# Patient Record
Sex: MALE | NOT HISPANIC OR LATINO | URBAN - METROPOLITAN AREA
[De-identification: names, ages, dates, MRNs, and addresses within clinical notes are randomized per-mention and may not be internally consistent; named-entity substitution may affect disease eponyms.]

---

## 2020-08-01 ENCOUNTER — HOSPITAL ENCOUNTER (EMERGENCY)
Facility: HOSPITAL | Age: 35
Discharge: HOME | End: 2020-08-01
Attending: EMERGENCY MEDICINE | Admitting: EMERGENCY MEDICINE

## 2020-08-01 VITALS
OXYGEN SATURATION: 92 % | DIASTOLIC BLOOD PRESSURE: 75 MMHG | HEIGHT: 69 IN | TEMPERATURE: 98.7 F | SYSTOLIC BLOOD PRESSURE: 106 MMHG | BODY MASS INDEX: 29.62 KG/M2 | WEIGHT: 200 LBS | RESPIRATION RATE: 22 BRPM | HEART RATE: 80 BPM

## 2020-08-01 DIAGNOSIS — T40.1X1A HEROIN OVERDOSE, ACCIDENTAL OR UNINTENTIONAL, INITIAL ENCOUNTER (HCC): Primary | ICD-10-CM

## 2020-08-01 DIAGNOSIS — F15.10 METHAMPHETAMINE ABUSE (HCC): ICD-10-CM

## 2020-08-01 DIAGNOSIS — F13.10 BENZODIAZEPINE ABUSE (HCC): ICD-10-CM

## 2020-08-01 PROCEDURE — 99283 EMERGENCY DEPT VISIT LOW MDM: CPT

## 2020-08-01 NOTE — DISCHARGE INSTRUCTIONS
The patient is advised to avoid further illegal drug use.    Follow-up with primary care physician or return to the ER as needed.

## 2020-08-01 NOTE — ED PROVIDER NOTES
Subjective   35-year-old male brought in by EMS for evaluation of overdose.  The patient reportedly was unresponsive until 2 mg of IV Narcan was administered by EMS in which case the patient awoke.  He reported to the EMS staff that he had used heroin and Xanax.  Here in the ER the patient is very somnolent but will awake and answer questions with moderate to physical stimuli is applied.  He denies any infectious symptoms including no fever, bodies, or chills.  No reported chest pain, cough, shortness of breath, sore throat, rhinorrhea, change in sense of taste or smell.  No reported abdominal pain, no nausea or vomiting, no change in bowel or urinary function.  He denies any sick contacts.  No other reported aggravating, alleviating, or associated symptoms.          Review of Systems   Constitutional: Negative for chills, fatigue and fever.        Somnolent   HENT: Negative for congestion, ear pain, postnasal drip, sinus pressure and sore throat.    Eyes: Negative for pain, redness and visual disturbance.   Respiratory: Negative for cough, chest tightness and shortness of breath.    Cardiovascular: Negative for chest pain, palpitations and leg swelling.   Gastrointestinal: Negative for abdominal pain, anal bleeding, blood in stool, diarrhea, nausea and vomiting.   Endocrine: Negative for polydipsia and polyuria.   Genitourinary: Negative for difficulty urinating, dysuria, frequency and urgency.   Musculoskeletal: Negative for arthralgias, back pain and neck pain.   Skin: Negative for pallor and rash.   Allergic/Immunologic: Negative for environmental allergies and immunocompromised state.   Neurological: Negative for dizziness, weakness and headaches.   Hematological: Negative for adenopathy.   Psychiatric/Behavioral: Positive for confusion. Negative for self-injury and suicidal ideas. The patient is not nervous/anxious.    All other systems reviewed and are negative.      No past medical history on file.    No  Known Allergies    No past surgical history on file.    No family history on file.    Social History     Socioeconomic History   • Marital status: Unknown     Spouse name: Not on file   • Number of children: Not on file   • Years of education: Not on file   • Highest education level: Not on file           Objective   Physical Exam   Constitutional: He is oriented to person, place, and time. He appears well-developed and well-nourished.  Non-toxic appearance. No distress.   somnolent   HENT:   Head: Normocephalic and atraumatic.   Right Ear: External ear normal.   Left Ear: External ear normal.   Nose: Nose normal.   Eyes: Pupils are equal, round, and reactive to light. EOM and lids are normal.   Neck: Normal range of motion. Neck supple. No tracheal deviation present.   Cardiovascular: Normal rate, regular rhythm and normal heart sounds. Exam reveals no gallop, no friction rub and no decreased pulses.   No murmur heard.  Pulmonary/Chest: Effort normal and breath sounds normal. No respiratory distress. He has no decreased breath sounds. He has no wheezes. He has no rhonchi. He has no rales.   Abdominal: Soft. Normal appearance and bowel sounds are normal. There is no tenderness. There is no rebound and no guarding.   Musculoskeletal: Normal range of motion. He exhibits no deformity.   Lymphadenopathy:     He has no cervical adenopathy.   Neurological: He is alert and oriented to person, place, and time. He has normal strength. No cranial nerve deficit or sensory deficit.   Skin: Skin is warm and dry. No rash noted. He is not diaphoretic.   Psychiatric: He has a normal mood and affect. His speech is normal and behavior is normal. Judgment and thought content normal. Cognition and memory are normal.   Nursing note and vitals reviewed.      Procedures           ED Course                                           MDM  Number of Diagnoses or Management Options  Benzodiazepine abuse (CMS/HCC): new and requires  workup  Heroin overdose, accidental or unintentional, initial encounter (CMS/[facility]): new and requires workup  Methamphetamine abuse (CMS/[facility]): new and requires workup  Diagnosis management comments: The patient was observed for several hours and oxygen saturations remained normal even while sleeping without application of oxygen.  Vital signs also remain normal including normal blood pressure.  The patient is afebrile and denies any respiratory symptoms.    The patient was observed to be able to stand and bear weight on his own and answers all questions appropriately.  He was able to call a friend who will actually take the patient home to continue to rest.    The patient denies being suicidal, reports using accommodation of heroin, Xanax, and methamphetamine for recreational purposes.       Amount and/or Complexity of Data Reviewed  Obtain history from someone other than the patient: yes  Review and summarize past medical records: yes  Independent visualization of images, tracings, or specimens: yes        Final diagnoses:   Heroin overdose, accidental or unintentional, initial encounter (CMS/[facility])   Methamphetamine abuse (CMS/[facility])   Benzodiazepine abuse (CMS/[facility])            Naveen Carlos MD  08/01/20 3361

## 2023-05-19 DIAGNOSIS — F90.2 ATTENTION DEFICIT HYPERACTIVITY DISORDER (ADHD), COMBINED TYPE: Primary | ICD-10-CM

## 2023-05-19 NOTE — TELEPHONE ENCOUNTER
Incoming Refill Request      Medication requested (name and dose):     VYVANCE 70MG ONE TIME DAILY     Pharmacy where request should be sent:    [facility] [facility].     Additional details provided by patient:     PATIENT IS COMPLETELY OUT OF MEDICINE    Best call back number: [phone]    Does the patient have less than a 3 day supply:  [x] Yes  [] No    Laila Deborah Grace Rep  05/19/23, 14:03 EDT        {Tip  DIRECTIONS Send the encounter HIGH priority, If patient has less than a 3 day supply. If the patient will run out of medication over the weekend add that information to the additional details line. Send this encounter to the clinical pool:1982

## 2023-05-22 NOTE — TELEPHONE ENCOUNTER
Rx Refill Note    Requested Prescriptions     Pending Prescriptions Disp Refills   • lisdexamfetamine (Vyvanse) 70 MG capsule 30 capsule 0     Sig: Take 1 capsule by mouth Every Morning        Last office visit with prescribing clinician: Visit date not found      Next office visit with prescribing clinician: 6/12/2023   Last labs:   Last refill:  n/a  Pharmacy (be sure to add in Epic). I believe this is the correct pharmacy?

## 2023-06-12 ENCOUNTER — TELEMEDICINE (OUTPATIENT)
Dept: BEHAVIORAL HEALTH | Facility: CLINIC | Age: 38
End: 2023-06-12
Payer: MEDICAID

## 2023-06-12 VITALS
HEIGHT: 69 IN | WEIGHT: 200.2 LBS | BODY MASS INDEX: 29.65 KG/M2 | SYSTOLIC BLOOD PRESSURE: 126 MMHG | DIASTOLIC BLOOD PRESSURE: 77 MMHG | HEART RATE: 73 BPM

## 2023-06-12 DIAGNOSIS — F90.2 ATTENTION DEFICIT HYPERACTIVITY DISORDER (ADHD), COMBINED TYPE: Primary | ICD-10-CM

## 2023-06-12 DIAGNOSIS — F15.21: ICD-10-CM

## 2023-06-12 DIAGNOSIS — F41.1 GENERALIZED ANXIETY DISORDER: ICD-10-CM

## 2023-06-12 DIAGNOSIS — E66.3 OVERWEIGHT (BMI 25.0-29.9): ICD-10-CM

## 2023-06-12 DIAGNOSIS — F33.1 MODERATE EPISODE OF RECURRENT MAJOR DEPRESSIVE DISORDER: ICD-10-CM

## 2023-06-12 DIAGNOSIS — F50.81 BINGE EATING DISORDER: ICD-10-CM

## 2023-06-12 DIAGNOSIS — F43.10 POST TRAUMATIC STRESS DISORDER (PTSD): ICD-10-CM

## 2023-06-12 PROBLEM — E66.9 OBESITY: Status: ACTIVE | Noted: 2020-02-17

## 2023-06-12 PROBLEM — R63.2 BINGE EATING: Status: ACTIVE | Noted: 2018-12-17

## 2023-06-12 PROBLEM — E55.9 VITAMIN D DEFICIENCY: Status: ACTIVE | Noted: 2019-09-13

## 2023-06-12 PROCEDURE — 1159F MED LIST DOCD IN RCRD: CPT | Performed by: NURSE PRACTITIONER

## 2023-06-12 PROCEDURE — 1160F RVW MEDS BY RX/DR IN RCRD: CPT | Performed by: NURSE PRACTITIONER

## 2023-06-12 PROCEDURE — 99214 OFFICE O/P EST MOD 30 MIN: CPT | Performed by: NURSE PRACTITIONER

## 2023-06-12 RX ORDER — OMEPRAZOLE 40 MG/1
40 CAPSULE, DELAYED RELEASE ORAL DAILY
COMMUNITY
Start: 2023-03-07

## 2023-06-12 RX ORDER — FLUOXETINE HYDROCHLORIDE 60 MG/1
TABLET, FILM COATED ORAL; ORAL
Qty: 30 TABLET | Refills: 2 | Status: SHIPPED | OUTPATIENT
Start: 2023-06-12

## 2023-06-12 RX ORDER — EMTRICITABINE AND TENOFOVIR ALAFENAMIDE 200; 25 MG/1; MG/1
1 TABLET ORAL DAILY
COMMUNITY
Start: 2023-05-25

## 2023-06-12 RX ORDER — BUPROPION HYDROCHLORIDE 300 MG/1
TABLET ORAL
COMMUNITY
Start: 2020-06-01 | End: 2023-06-12 | Stop reason: SDUPTHER

## 2023-06-12 RX ORDER — CLONAZEPAM 1 MG/1
TABLET ORAL
Qty: 60 TABLET | Refills: 2 | Status: SHIPPED | OUTPATIENT
Start: 2023-06-12

## 2023-06-12 RX ORDER — CLONAZEPAM 1 MG/1
TABLET ORAL
COMMUNITY
Start: 2020-06-11 | End: 2023-06-12 | Stop reason: SDUPTHER

## 2023-06-12 RX ORDER — BUPROPION HYDROCHLORIDE 300 MG/1
TABLET ORAL
Qty: 30 TABLET | Refills: 2 | Status: SHIPPED | OUTPATIENT
Start: 2023-06-12

## 2023-06-12 RX ORDER — FLUOXETINE HYDROCHLORIDE 60 MG/1
TABLET, FILM COATED ORAL; ORAL
COMMUNITY
Start: 2023-05-17 | End: 2023-06-12 | Stop reason: SDUPTHER

## 2023-06-12 NOTE — PROGRESS NOTES
Follow Up Office Visit      Patient Name: Amos Echevarria  : 1985   MRN: [id number]     Referring Provider: Barber West MD    Chief Complaint:    No diagnosis found.     History of Present Illness:   Amos Echevarria is a 38 y.o. male who is here today for follow up with ***    Subjective      Patient Reports: ***    Review of Systems:   Review of Systems    Screening Scores:   {Screen Scores:26905}    RISK ASSESSMENT:  Patient denies any thoughts or intent of suicide today. Patient denies any impulsive behavior today.     Medications:     Current Outpatient Medications:     lisdexamfetamine (Vyvanse) 70 MG capsule, Take 1 capsule by mouth Every Morning, Disp: 30 capsule, Rfl: 0    Medication Considerations:  JABIER reviewed and appropriate.      Allergies:   No Known Allergies    Results Reviewed:   ***     Objective     Physical Exam:  Vital Signs: There were no vitals filed for this visit.  There is no height or weight on file to calculate BMI.     Mental Status Exam:   Hygiene:   {good/fair/poor:919388839}  Cooperation:  {Cooperation:774350459}  Eye Contact:  {Eye Contact:266338387}  Psychomotor Behavior:  {Psychomotor Behavior:42053}  Affect:  {Affect:285909326}  Mood: {Mood:04557}  Speech:  {Speech:728182480}  Thought Process:  {Thought Process:854425382}  Thought Content:  {Thought Content:823233118}  Suicidal:  {Suicidal:850459529}  Homicidal:  {Homidical:789775309}  Hallucinations:  {Hallucinations:033385916}  Delusion:  {Delusion:554652538}  Memory:  {Memory:385281551}  Orientation:  {Orientation:413338036}  Reliability:  {good/fair/poor:942033684}  Insight:  {good/fair/poor/none:718176097}  Judgement:  {good/fair/poor/impaired:334706195}  Impulse Control:  {good/fair/poor/impaired:252155974}  Physical/Medical Issues:  {No/Yes:038528673}     Assessment / Plan      Visit Diagnosis/Orders Placed This Visit:  There are no diagnoses linked to this encounter.     Functional Status:  {rsfunctionalstatus:79790}    Prognosis: {tnprognosis:80995}    Impression/Formulation:  Patient appeared alert and oriented.  Patient is voluntarily requesting to begin outpatient therapy at [facility].  Patient is receptive to assistance with maintaining a stable lifestyle.  Patient presents with history of No diagnosis found..     Treatment Plan:     ***  Patient will continue supportive psychotherapy efforts and medications as indicated. Clinic will obtain release of information for current treatment team for continuity of care as needed. Patient will contact this office, call 911 or present to the nearest emergency room should suicidal or homicidal ideations occur.  Discussed medication options and treatment plan of prescribed medication(s) as well as the risks, benefits, and potential side effects. Patient ackowledged and verbally consented to continue with current treatment plan and was educated on the importance of compliance with treatment and follow-up appointments.     Follow Up:   No follow-ups on file.        HERBERT Jarrell, SULAIMAN  [facility]     This is electronically signed by HERBERT Jarrell, SULAIMAN  [unfilled] 16:24 EDT

## 2023-06-12 NOTE — PROGRESS NOTES
Video Visit      Patient Name: Amos Echevarria  : 1985   MRN: [id number]     Referring Provider: Barber West MD    Chief Complaint:      ICD-10-CM ICD-9-CM   1. Attention deficit hyperactivity disorder (ADHD), combined type  F90.2 314.01   2. Generalized anxiety disorder  F41.1 300.02   3. Moderate episode of recurrent major depressive disorder  F33.1 296.32   4. Post traumatic stress disorder (PTSD)  F43.10 309.81   5. Binge eating disorder  F50.81 307.50   6. Overweight (BMI 25.0-29.9)  E66.3 278.02   7. Moderate amphetamine substance use disorder, in sustained remission  F15.21 304.43        This provider is located at the [facility] [address] [place] [address] using a secure Contentful Video Visit through [employer]. Patient is being seen remotely via telehealth video visit at their home address in [place], and stated they are in a secure environment for this session. The patient's condition being diagnosed/treated is appropriate for telemedicine. The provider identified herself as well as her credentials. The patient, and/or patients guardian, consent to be seen remotely, and when consent is given they understand that the consent allows for patient identifiable information to be sent to a third party as needed. They may refuse to be seen remotely at any time. The electronic data is encrypted and password protected, and the patient and/or guardian has been advised of the potential risks to privacy not withstanding such measures.    The patient has chosen to receive care today through a telehealth video visit. Do you consent to use a video/audio connection for your medical care today? Yes    History of Present Illness:   Amos Echevarria is a 38 y.o. male who is an established patient being seen by a video visit today for psychiatric medication management.    Subjective      Patient reports he is struggling with his emotions  lately because he hasn't been able to communicate as often with his mom as he is used to.  He has also regained about 20+ pounds since he was in the weight management program due to binge eating carbohydrates and sweets which is causing him to feel bad about himself.  He is still in the process of looking for a job which has been stressful.  He has a telephone interview coming up for a position that he is very interested in and would be well suited for.  He reports adhd symptoms have been worse but it could be due to everything going on in his life right now.    Review of Systems:   Review of Systems   Psychiatric/Behavioral:  Positive for decreased concentration, dysphoric mood and stress. The patient is nervous/anxious.      Screening Scores:   None today.    RISK ASSESSMENT:  Patient denies any high risk factors today.     Medications:     Current Outpatient Medications:     buPROPion XL (WELLBUTRIN XL) 300 MG 24 hr tablet, Take 1 tablet by mouth daily., Disp: 30 tablet, Rfl: 2    Cholecalciferol 25 MCG (1000 UT) tablet, Take 1 tablet by mouth Daily., Disp: , Rfl:     clonazePAM (KlonoPIN) 1 MG tablet, Take 1 tablet by mouth twice daily as needed for anxiety., Disp: 60 tablet, Rfl: 2    Descovy 200-25 MG per tablet, Take 1 tablet by mouth Daily., Disp: , Rfl:     dolutegravir (TIVICAY) 50 MG tablet, Take 1 tablet by mouth Daily., Disp: , Rfl:     FLUoxetine (PROzac) 60 MG tablet, Take 1 capsule by mouth daily., Disp: 30 tablet, Rfl: 2    lisdexamfetamine (Vyvanse) 70 MG capsule, Take 1 capsule by mouth Every Morning, Disp: 30 capsule, Rfl: 0    Naltrexone (VIVITROL) 380 MG reconstituted suspension IM suspension, Inject 380 mg into the appropriate muscle as directed by prescriber Every 28 (Twenty-Eight) Days., Disp: , Rfl:     omeprazole (priLOSEC) 40 MG capsule, Take 1 capsule by mouth Daily., Disp: , Rfl:     Medication Considerations:  JABIER reviewed and appropriate.      Allergies:   No Known  "Allergies    Objective     Physical Exam:  Vital Signs:   Vitals:    06/12/23 1715   BP: 126/77   Pulse: 73   Weight: 90.8 kg (200 lb 3.2 oz)   Height: 175.3 cm (69\")     Body mass index is 29.56 kg/m².     Mental Status Exam:   Hygiene:   good  Cooperation:  Cooperative  Eye Contact:  Good  Psychomotor Behavior:  Appropriate  Affect:  Appropriate  Mood: sad  Speech:  Normal  Thought Process:  Goal directed and Linear  Thought Content:  Normal  Suicidal:  None  Homicidal:  None  Hallucinations:  None  Delusion:  None  Memory:  Intact  Orientation:  Grossly intact  Reliability:  good  Insight:  Good  Judgement:  Fair  Impulse Control:  Fair  Physical/Medical Issues:  Yes HIV positive, weight gain      Assessment / Plan      Visit Diagnosis/Orders Placed This Visit:  Diagnoses and all orders for this visit:    1. Attention deficit hyperactivity disorder (ADHD), combined type (Primary)  -     buPROPion XL (WELLBUTRIN XL) 300 MG 24 hr tablet; Take 1 tablet by mouth daily.  Dispense: 30 tablet; Refill: 2  -     lisdexamfetamine (Vyvanse) 70 MG capsule; Take 1 capsule by mouth Every Morning  Dispense: 30 capsule; Refill: 0    2. Generalized anxiety disorder  -     FLUoxetine (PROzac) 60 MG tablet; Take 1 capsule by mouth daily.  Dispense: 30 tablet; Refill: 2  -     clonazePAM (KlonoPIN) 1 MG tablet; Take 1 tablet by mouth twice daily as needed for anxiety.  Dispense: 60 tablet; Refill: 2    3. Moderate episode of recurrent major depressive disorder  -     buPROPion XL (WELLBUTRIN XL) 300 MG 24 hr tablet; Take 1 tablet by mouth daily.  Dispense: 30 tablet; Refill: 2  -     FLUoxetine (PROzac) 60 MG tablet; Take 1 capsule by mouth daily.  Dispense: 30 tablet; Refill: 2    4. Post traumatic stress disorder (PTSD)  -     FLUoxetine (PROzac) 60 MG tablet; Take 1 capsule by mouth daily.  Dispense: 30 tablet; Refill: 2    5. Binge eating disorder  -     lisdexamfetamine (Vyvanse) 70 MG capsule; Take 1 capsule by mouth Every " Morning  Dispense: 30 capsule; Refill: 0    6. Overweight (BMI 25.0-29.9)  -     buPROPion XL (WELLBUTRIN XL) 300 MG 24 hr tablet; Take 1 tablet by mouth daily.  Dispense: 30 tablet; Refill: 2    7. Moderate amphetamine substance use disorder, in sustained remission         Functional Status: No impairment    Prognosis: Good with Ongoing Treatment     Impression/Formulation:  Patient appeared alert and oriented.  Patient is voluntarily requesting to begin outpatient medication management at [facility].  Patient is receptive to assistance with maintaining a stable lifestyle.  Patient presents with history of     ICD-10-CM ICD-9-CM   1. Attention deficit hyperactivity disorder (ADHD), combined type  F90.2 314.01   2. Generalized anxiety disorder  F41.1 300.02   3. Moderate episode of recurrent major depressive disorder  F33.1 296.32   4. Post traumatic stress disorder (PTSD)  F43.10 309.81   5. Binge eating disorder  F50.81 307.50   6. Overweight (BMI 25.0-29.9)  E66.3 278.02   7. Moderate amphetamine substance use disorder, in sustained remission  F15.21 304.43       Patient has gained weight due to carbohydrate cravings and binge eating.  Patient has been more emotional due to life stressors, looking for a new job, decrease with family communication but feels his medications are working for him.  Patient agrees that stress and worries are having a negative effect and will work towards accomplishing his goals.      Treatment Plan:     Continue medications at current dosing  Schedule appointment for weight management  Patient will continue supportive psychotherapy efforts and medications as indicated. Clinic will obtain release of information for current treatment team for continuity of care as needed. Patient will contact this office, call 911 or present to the nearest emergency room should suicidal or homicidal ideations occur. Discussed medication options and treatment plan of prescribed  medication(s) as well as the risks, benefits, and potential side effects. Patient ackowledged and verbally consented to continue with current treatment plan and was educated on the importance of compliance with treatment and follow-up appointments.     Follow Up:   Return in about 1 week (around 6/19/2023) for Weight Mgmt.        HERBERT Jarrell, KEMIP-BC  [facility]     This is electronically signed by HERBERT Jarrell, SULAIMAN  [unfilled] 17:16 EDT

## 2023-08-23 DIAGNOSIS — F90.2 ATTENTION DEFICIT HYPERACTIVITY DISORDER (ADHD), COMBINED TYPE: ICD-10-CM

## 2023-08-23 DIAGNOSIS — F50.81 BINGE EATING DISORDER: ICD-10-CM

## 2023-08-24 ENCOUNTER — TELEPHONE (OUTPATIENT)
Dept: BEHAVIORAL HEALTH | Facility: CLINIC | Age: 38
End: 2023-08-24
Payer: MEDICAID

## 2023-08-24 NOTE — TELEPHONE ENCOUNTER
Patient called and left a message saying that his pharmacy said that his Vyvanse 70mg is on backorder and is wanting to know if you can send in a Rx for Vyvanse 30mg and 40mg.

## 2023-09-20 ENCOUNTER — TELEMEDICINE (OUTPATIENT)
Dept: BEHAVIORAL HEALTH | Facility: CLINIC | Age: 38
End: 2023-09-20
Payer: MEDICAID

## 2023-09-20 DIAGNOSIS — F33.1 MODERATE EPISODE OF RECURRENT MAJOR DEPRESSIVE DISORDER: ICD-10-CM

## 2023-09-20 DIAGNOSIS — F43.10 POST TRAUMATIC STRESS DISORDER (PTSD): ICD-10-CM

## 2023-09-20 DIAGNOSIS — F50.81 BINGE EATING DISORDER: ICD-10-CM

## 2023-09-20 DIAGNOSIS — F90.2 ATTENTION DEFICIT HYPERACTIVITY DISORDER (ADHD), COMBINED TYPE: ICD-10-CM

## 2023-09-20 DIAGNOSIS — E66.3 OVERWEIGHT (BMI 25.0-29.9): ICD-10-CM

## 2023-09-20 DIAGNOSIS — F41.1 GENERALIZED ANXIETY DISORDER: Primary | ICD-10-CM

## 2023-09-20 RX ORDER — BUPROPION HYDROCHLORIDE 300 MG/1
TABLET ORAL
Qty: 30 TABLET | Refills: 2 | Status: SHIPPED | OUTPATIENT
Start: 2023-09-20

## 2023-09-20 RX ORDER — CLONAZEPAM 1 MG/1
TABLET ORAL
Qty: 60 TABLET | Refills: 2 | Status: SHIPPED | OUTPATIENT
Start: 2023-09-20

## 2023-09-20 RX ORDER — FLUOXETINE HYDROCHLORIDE 60 MG/1
TABLET, FILM COATED ORAL; ORAL
Qty: 30 TABLET | Refills: 2 | Status: SHIPPED | OUTPATIENT
Start: 2023-09-20

## 2023-09-20 NOTE — PROGRESS NOTES
Video Visit      Patient Name: Amos Echevarria  : 1985   MRN: [id number]     Referring Provider: Barber West MD    Chief Complaint:      ICD-10-CM ICD-9-CM   1. Generalized anxiety disorder  F41.1 300.02   2. Attention deficit hyperactivity disorder (ADHD), combined type  F90.2 314.01   3. Moderate episode of recurrent major depressive disorder  F33.1 296.32   4. Post traumatic stress disorder (PTSD)  F43.10 309.81   5. Overweight (BMI 25.0-29.9)  E66.3 278.02   6. Binge eating disorder  F50.81 307.50        This provider is located at the [facility] (through [facility]), [address] [place] [address]. [id number] using a secure 9flatst Video Visit through [employer]. Patient is being seen remotely via telehealth video visit at their home address in [place], and stated they are in a secure environment for this session. The patient's condition being diagnosed/treated is appropriate for telemedicine. The provider identified herself as well as her credentials. The patient, and/or patients guardian, consent to be seen remotely, and when consent is given they understand that the consent allows for patient identifiable information to be sent to a third party as needed. They may refuse to be seen remotely at any time. The electronic data is encrypted and password protected, and the patient and/or guardian has been advised of the potential risks to privacy not withstanding such measures.    The patient has chosen to receive care today through a telehealth video visit. Do you consent to use a video/audio connection for your medical care today? Yes    History of Present Illness:   Amos Echevarria is a 38 y.o. male who is being seen by a video visit today for psychiatric medications.    Subjective      I had an interview with a new office at [employer] today.  I think I got the job.  My car was taken away because it wasn't put in my name and Amos had a judgement  against him.  I am going to have to move in November, Guera mom is selling the house we are living in. My mom and sister want me to come back to [place].       I have gained a lot of weight and I think something is going on with my feet.  My doctor thinks it could be diabetes related.  I need to see her again soon.  I am having terrible sweet cravings and can't stop binge eating at night. I want to come back and see you for weight management again.    Review of Systems:   Review of Systems   Psychiatric/Behavioral:  Positive for dysphoric mood and stress. The patient is nervous/anxious.      RISK ASSESSMENT:   Patient denies any high risk factors today.     Medications:     Current Outpatient Medications:     buPROPion XL (WELLBUTRIN XL) 300 MG 24 hr tablet, Take 1 tablet by mouth daily., Disp: 30 tablet, Rfl: 2    clonazePAM (KlonoPIN) 1 MG tablet, Take 1 tablet by mouth twice daily as needed for anxiety., Disp: 60 tablet, Rfl: 2    FLUoxetine (PROzac) 60 MG tablet, Take 1 capsule by mouth daily., Disp: 30 tablet, Rfl: 2    lisdexamfetamine (Vyvanse) 70 MG capsule, Take 1 capsule by mouth Every Morning, Disp: 30 capsule, Rfl: 0    Cholecalciferol 25 MCG (1000 UT) tablet, Take 1 tablet by mouth Daily., Disp: , Rfl:     Descovy 200-25 MG per tablet, Take 1 tablet by mouth Daily., Disp: , Rfl:     dolutegravir (TIVICAY) 50 MG tablet, Take 1 tablet by mouth Daily., Disp: , Rfl:     Naltrexone (VIVITROL) 380 MG reconstituted suspension IM suspension, Inject 380 mg into the appropriate muscle as directed by prescriber Every 28 (Twenty-Eight) Days., Disp: , Rfl:     omeprazole (priLOSEC) 40 MG capsule, Take 1 capsule by mouth Daily., Disp: , Rfl:     Medication Considerations:  JABIER reviewed and appropriate.      Allergies:   No Known Allergies    Objective     Physical Exam:  Vital Signs: There were no vitals filed for this visit.  There is no height or weight on file to calculate BMI.     Mental Status Exam:    Hygiene:   good  Cooperation:  Cooperative  Eye Contact:  Good  Psychomotor Behavior:  Hyperactive  Affect:  Appropriate  Mood: depressed, anxious, irritable, and fluctates  Speech:  Rapid  Thought Process:  Linear  Thought Content:  Mood congruent  Suicidal:  None  Homicidal:  None  Hallucinations:  None  Delusion:  None  Memory:  Intact  Orientation:  Grossly intact  Reliability:  fair  Insight:  Fair  Judgement:  Fair  Impulse Control:  Poor  Physical/Medical Issues:  Yes foot fungus/neuropathy/circulation issue, HIV      Assessment / Plan      Visit Diagnosis/Orders Placed This Visit:  Diagnoses and all orders for this visit:    1. Generalized anxiety disorder (Primary)  -     clonazePAM (KlonoPIN) 1 MG tablet; Take 1 tablet by mouth twice daily as needed for anxiety.  Dispense: 60 tablet; Refill: 2  -     FLUoxetine (PROzac) 60 MG tablet; Take 1 capsule by mouth daily.  Dispense: 30 tablet; Refill: 2    2. Attention deficit hyperactivity disorder (ADHD), combined type  -     buPROPion XL (WELLBUTRIN XL) 300 MG 24 hr tablet; Take 1 tablet by mouth daily.  Dispense: 30 tablet; Refill: 2  -     lisdexamfetamine (Vyvanse) 70 MG capsule; Take 1 capsule by mouth Every Morning  Dispense: 30 capsule; Refill: 0    3. Moderate episode of recurrent major depressive disorder  -     buPROPion XL (WELLBUTRIN XL) 300 MG 24 hr tablet; Take 1 tablet by mouth daily.  Dispense: 30 tablet; Refill: 2  -     FLUoxetine (PROzac) 60 MG tablet; Take 1 capsule by mouth daily.  Dispense: 30 tablet; Refill: 2    4. Post traumatic stress disorder (PTSD)  -     FLUoxetine (PROzac) 60 MG tablet; Take 1 capsule by mouth daily.  Dispense: 30 tablet; Refill: 2    5. Overweight (BMI 25.0-29.9)  -     buPROPion XL (WELLBUTRIN XL) 300 MG 24 hr tablet; Take 1 tablet by mouth daily.  Dispense: 30 tablet; Refill: 2    6. Binge eating disorder  -     lisdexamfetamine (Vyvanse) 70 MG capsule; Take 1 capsule by mouth Every Morning  Dispense: 30  capsule; Refill: 0         Functional Status: Moderate impairment     Prognosis: Guarded with Ongoing Treatment    Impression/Formulation:  Patient appeared alert and oriented.  Patient is voluntarily requesting to begin outpatient therapy at [facility].  Patient is receptive to assistance with maintaining a stable lifestyle.  Patient presents with history of     ICD-10-CM ICD-9-CM   1. Generalized anxiety disorder  F41.1 300.02   2. Attention deficit hyperactivity disorder (ADHD), combined type  F90.2 314.01   3. Moderate episode of recurrent major depressive disorder  F33.1 296.32   4. Post traumatic stress disorder (PTSD)  F43.10 309.81   5. Overweight (BMI 25.0-29.9)  E66.3 278.02   6. Binge eating disorder  F50.81 307.50   .   Advised patient to follow up with pcp regarding feet and get new labs done.  Patient is experiencing multiple life stressors at this time and has gained approximately 20-25 pounds since his last weight management appointment.  Patient may be relocating.    Treatment Plan:     Continue clonazepam, vyvanse, wellbutrin, fluoxetine.  Encouraged individual psycotherapy.  Patient will continue supportive psychotherapy efforts and medications as indicated. Clinic will obtain release of information for current treatment team for continuity of care as needed. Patient will contact this office, call 911 or present to the nearest emergency room should suicidal or homicidal ideations occur. Discussed medication options and treatment plan of prescribed medication(s) as well as the risks, benefits, and potential side effects. Patient ackowledged and verbally consented to continue with current treatment plan and was educated on the importance of compliance with treatment and follow-up appointments.     Follow Up:   Return in about 3 months (around 12/20/2023) for Med Check.        HERBERT Jarrell, PMHNP-BC [facility]     This is electronically  signed by HERBERT Jarrell, PMHNP-BC  [unfilled] 17:46 EDT

## 2023-10-21 DIAGNOSIS — F90.2 ATTENTION DEFICIT HYPERACTIVITY DISORDER (ADHD), COMBINED TYPE: ICD-10-CM

## 2023-10-21 DIAGNOSIS — F50.81 BINGE EATING DISORDER: ICD-10-CM

## 2023-10-23 ENCOUNTER — TELEPHONE (OUTPATIENT)
Dept: BEHAVIORAL HEALTH | Facility: CLINIC | Age: 38
End: 2023-10-23
Payer: MEDICAID

## 2023-10-23 RX ORDER — LISDEXAMFETAMINE DIMESYLATE 70 MG/1
70 CAPSULE ORAL EVERY MORNING
Qty: 30 CAPSULE | Refills: 0 | Status: SHIPPED | OUTPATIENT
Start: 2023-10-23

## 2023-10-23 NOTE — TELEPHONE ENCOUNTER
Refill sent to pharmacy   Prednisone Counseling:  I discussed with the patient the risks of prolonged use of prednisone including but not limited to weight gain, insomnia, osteoporosis, mood changes, diabetes, susceptibility to infection, glaucoma and high blood pressure.  In cases where prednisone use is prolonged, patients should be monitored with blood pressure checks, serum glucose levels and an eye exam.  Additionally, the patient may need to be placed on GI prophylaxis, PCP prophylaxis, and calcium and vitamin D supplementation and/or a bisphosphonate.  The patient verbalized understanding of the proper use and the possible adverse effects of prednisone.  All of the patient's questions and concerns were addressed.

## 2023-10-23 NOTE — TELEPHONE ENCOUNTER
CALLED PATIENT TO SCHEDULE MED CHECK IN OFFICE APPT WITH ISMAEL THOMAS AROUND 12/20/23      LEFT DETAILED MESSAGE

## 2023-11-21 ENCOUNTER — TELEPHONE (OUTPATIENT)
Dept: BEHAVIORAL HEALTH | Facility: CLINIC | Age: 38
End: 2023-11-21
Payer: MEDICAID

## 2023-11-21 DIAGNOSIS — F90.2 ATTENTION DEFICIT HYPERACTIVITY DISORDER (ADHD), COMBINED TYPE: ICD-10-CM

## 2023-11-21 DIAGNOSIS — F50.81 BINGE EATING DISORDER: ICD-10-CM

## 2023-11-21 RX ORDER — LISDEXAMFETAMINE DIMESYLATE 70 MG/1
70 CAPSULE ORAL EVERY MORNING
Qty: 30 CAPSULE | Refills: 0 | Status: SHIPPED | OUTPATIENT
Start: 2023-11-21

## 2023-11-21 NOTE — TELEPHONE ENCOUNTER
Incoming Refill Request      Medication requested (name and dose):     VYVANSE 70 MG CAPSULE     Pharmacy where request should be sent:     [facility] - [place]     Additional details provided by patient:     PATIENT HAS MED CHECK APPT 12/20/23    Best call back number: [phone]    Does the patient have less than a 3 day supply:  [x] Yes  [] No    Deborah Velez Rep  11/21/23, 10:11 EST        {Tip  DIRECTIONS Send the encounter HIGH priority, If patient has less than a 3 day supply. If the patient will run out of medication over the weekend add that information to the additional details line. Send this encounter to the clinical pool:[id number]

## 2023-12-20 ENCOUNTER — TELEMEDICINE (OUTPATIENT)
Dept: BEHAVIORAL HEALTH | Facility: CLINIC | Age: 38
End: 2023-12-20
Payer: MEDICAID

## 2023-12-20 ENCOUNTER — CLINICAL SUPPORT (OUTPATIENT)
Dept: BEHAVIORAL HEALTH | Facility: CLINIC | Age: 38
End: 2023-12-20
Payer: MEDICAID

## 2023-12-20 DIAGNOSIS — E66.3 OVERWEIGHT (BMI 25.0-29.9): ICD-10-CM

## 2023-12-20 DIAGNOSIS — F50.81 BINGE EATING DISORDER: ICD-10-CM

## 2023-12-20 DIAGNOSIS — F33.1 MODERATE EPISODE OF RECURRENT MAJOR DEPRESSIVE DISORDER: ICD-10-CM

## 2023-12-20 DIAGNOSIS — F90.2 ATTENTION DEFICIT HYPERACTIVITY DISORDER (ADHD), COMBINED TYPE: ICD-10-CM

## 2023-12-20 DIAGNOSIS — F41.1 GENERALIZED ANXIETY DISORDER: Primary | ICD-10-CM

## 2023-12-20 DIAGNOSIS — F43.10 POST TRAUMATIC STRESS DISORDER (PTSD): ICD-10-CM

## 2023-12-20 PROBLEM — F50.819 BINGE EATING DISORDER: Status: ACTIVE | Noted: 2018-12-17

## 2023-12-20 LAB
POC AMPHETAMINES: NEGATIVE
POC BARBITURATES: NEGATIVE
POC BENZODIAZEPHINES: NEGATIVE
POC COCAINE: NEGATIVE
POC METHADONE: NEGATIVE
POC METHAMPHETAMINE SCREEN URINE: NEGATIVE
POC OPIATES: NEGATIVE
POC OXYCODONE: NEGATIVE
POC PHENCYCLIDINE: NEGATIVE
POC PROPOXYPHENE: NEGATIVE
POC THC: NEGATIVE
POC TRICYCLIC ANTIDEPRESSANTS: NEGATIVE

## 2023-12-20 RX ORDER — BUPROPION HYDROCHLORIDE 300 MG/1
TABLET ORAL
Qty: 30 TABLET | Refills: 2 | Status: SHIPPED | OUTPATIENT
Start: 2023-12-20

## 2023-12-20 RX ORDER — FLUOXETINE HYDROCHLORIDE 60 MG/1
TABLET, FILM COATED ORAL; ORAL
Qty: 30 TABLET | Refills: 2 | Status: SHIPPED | OUTPATIENT
Start: 2023-12-20

## 2023-12-20 RX ORDER — LISDEXAMFETAMINE DIMESYLATE 70 MG/1
70 CAPSULE ORAL EVERY MORNING
Qty: 30 CAPSULE | Refills: 0 | Status: SHIPPED | OUTPATIENT
Start: 2023-12-20

## 2023-12-20 NOTE — PROGRESS NOTES
Video Visit      Patient Name: Amos Echevarria  : 1985   MRN: [id number]     Referring Provider: Barber West MD    Chief Complaint:      ICD-10-CM ICD-9-CM   1. Generalized anxiety disorder  F41.1 300.02   2. Moderate episode of recurrent major depressive disorder  F33.1 296.32   3. Post traumatic stress disorder (PTSD)  F43.10 309.81   4. Attention deficit hyperactivity disorder (ADHD), combined type  F90.2 314.01   5. Binge eating disorder  F50.81 307.50   6. Overweight (BMI 25.0-29.9)  E66.3 278.02        This provider is located at the [facility] (through [facility]), [address] [place] [address]. [facility] using a secure [employer] Video Visit through [employer]. Patient is being seen remotely via telehealth video visit at their home address in [place], and stated they are in a secure environment for this session. The patient's condition being diagnosed/treated is appropriate for telemedicine. The provider identified herself as well as her credentials. The patient, and/or patients guardian, consent to be seen remotely, and when consent is given they understand that the consent allows for patient identifiable information to be sent to a third party as needed. They may refuse to be seen remotely at any time. The electronic data is encrypted and password protected, and the patient and/or guardian has been advised of the potential risks to privacy not withstanding such measures.    The patient has chosen to receive care today through a telehealth video visit. Do you consent to use a video/audio connection for your medical care today? Yes    History of Present Illness:   Amos Echevarria is a 38 y.o. male who is being seen by a video visit today for psychiatric medications.    Subjective      Heading to my job so I had to switch to video visit today.  Miserable with my relationship but I don't really want to move back to [place] with my mom.  She asks me to come  home every day.  Amos and MARLO are sleeping in separate bedrooms now.    He is such an asshole, he is a narcissist and lies all the time.  He has shady people coming in and out of the house all the time.  His mom has told us we have to move out of the house because she wants to sell it.  I don't know why I stay in this toxic relationship, I think I just feel stuck because I can't afford to live on my own.    Review of Systems:   Review of Systems   Psychiatric/Behavioral:  Positive for depressed mood and stress. The patient is nervous/anxious.        Screening Scores:   PHQ 2: 1    RISK ASSESSMENT:  Patient denies any high risk factors today.     Medications:     Current Outpatient Medications:     buPROPion XL (WELLBUTRIN XL) 300 MG 24 hr tablet, Take 1 tablet by mouth daily., Disp: 30 tablet, Rfl: 2    FLUoxetine (PROzac) 60 MG tablet, Take 1 capsule by mouth daily., Disp: 30 tablet, Rfl: 2    Vyvanse 70 MG capsule, Take 1 capsule by mouth Every Morning, Disp: 30 capsule, Rfl: 0    Cholecalciferol 25 MCG (1000 UT) tablet, Take 1 tablet by mouth Daily., Disp: , Rfl:     clonazePAM (KlonoPIN) 1 MG tablet, Take 1 tablet by mouth twice daily as needed for anxiety., Disp: 60 tablet, Rfl: 2    Descovy 200-25 MG per tablet, Take 1 tablet by mouth Daily., Disp: , Rfl:     dolutegravir (TIVICAY) 50 MG tablet, Take 1 tablet by mouth Daily., Disp: , Rfl:     Naltrexone (VIVITROL) 380 MG reconstituted suspension IM suspension, Inject 380 mg into the appropriate muscle as directed by prescriber Every 28 (Twenty-Eight) Days., Disp: , Rfl:     omeprazole (priLOSEC) 40 MG capsule, Take 1 capsule by mouth Daily., Disp: , Rfl:     Medication Considerations:  JABIER reviewed and appropriate.      Allergies:   No Known Allergies    Objective     Physical Exam:  Vital Signs: There were no vitals filed for this visit.  There is no height or weight on file to calculate BMI.     Mental Status Exam:   Hygiene:   good  Cooperation:   Cooperative  Eye Contact:  Good  Psychomotor Behavior:  Hyperactive  Affect:  Appropriate and Angry  Mood: anxious and irritable  Speech:  Rapid  Thought Process:  Tangential  Thought Content:  Mood congruent  Suicidal:  None  Homicidal:  None  Hallucinations:  None  Delusion:  None  Memory:  Intact  Orientation:  Grossly intact  Reliability:  fair  Insight:  Fair  Judgement:  Fair  Impulse Control:  Poor  Physical/Medical Issues:  Yes gained weight      Assessment / Plan      Visit Diagnosis/Orders Placed This Visit:  Diagnoses and all orders for this visit:    1. Generalized anxiety disorder (Primary)  -     FLUoxetine (PROzac) 60 MG tablet; Take 1 capsule by mouth daily.  Dispense: 30 tablet; Refill: 2    2. Moderate episode of recurrent major depressive disorder  -     FLUoxetine (PROzac) 60 MG tablet; Take 1 capsule by mouth daily.  Dispense: 30 tablet; Refill: 2  -     buPROPion XL (WELLBUTRIN XL) 300 MG 24 hr tablet; Take 1 tablet by mouth daily.  Dispense: 30 tablet; Refill: 2    3. Post traumatic stress disorder (PTSD)  -     FLUoxetine (PROzac) 60 MG tablet; Take 1 capsule by mouth daily.  Dispense: 30 tablet; Refill: 2    4. Attention deficit hyperactivity disorder (ADHD), combined type  -     Vyvanse 70 MG capsule; Take 1 capsule by mouth Every Morning  Dispense: 30 capsule; Refill: 0  -     buPROPion XL (WELLBUTRIN XL) 300 MG 24 hr tablet; Take 1 tablet by mouth daily.  Dispense: 30 tablet; Refill: 2    5. Binge eating disorder  -     Vyvanse 70 MG capsule; Take 1 capsule by mouth Every Morning  Dispense: 30 capsule; Refill: 0    6. Overweight (BMI 25.0-29.9)  -     buPROPion XL (WELLBUTRIN XL) 300 MG 24 hr tablet; Take 1 tablet by mouth daily.  Dispense: 30 tablet; Refill: 2         Functional Status: Moderate impairment     Prognosis: Guarded with Ongoing Treatment    Impression/Formulation:  Patient appeared alert and oriented.  Patient is receptive to assistance with maintaining a stable lifestyle.   Patient presents with history of     ICD-10-CM ICD-9-CM   1. Generalized anxiety disorder  F41.1 300.02   2. Moderate episode of recurrent major depressive disorder  F33.1 296.32   3. Post traumatic stress disorder (PTSD)  F43.10 309.81   4. Attention deficit hyperactivity disorder (ADHD), combined type  F90.2 314.01   5. Binge eating disorder  F50.81 307.50   6. Overweight (BMI 25.0-29.9)  E66.3 278.02     Patient complaining of lifestyle stressors.  Has been out of medicine for a few days but does well with the combination.  Encouraged patient to make the effort to change his environment to improve his quality of life.    Treatment Plan:   Signed controlled substance agreement, completed and passed UDS.  Continue clonazepam-too early for refill today  Continue vyvanse, fluoxetine, wellbutrin xl  Encouraged therapy    Patient will continue supportive psychotherapy efforts and medications as indicated. Clinic will obtain release of information for current treatment team for continuity of care as needed. Patient will contact this office, call 911 or present to the nearest emergency room should suicidal or homicidal ideations occur. Discussed medication options and treatment plan of prescribed medication(s) as well as the risks, benefits, and potential side effects. Patient ackowledged and verbally consented to continue with current treatment plan and was educated on the importance of compliance with treatment and follow-up appointments.     Follow Up:   Return in about 12 weeks (around 3/13/2024) for Med Check.        HERBERT Jarrell, SULAIMAN  [facility]     This is electronically signed by HERBERT Jarrell, SULAIMAN  [unfilled] 13:47 EST

## 2024-01-22 DIAGNOSIS — F50.81 BINGE EATING DISORDER: ICD-10-CM

## 2024-01-22 DIAGNOSIS — F90.2 ATTENTION DEFICIT HYPERACTIVITY DISORDER (ADHD), COMBINED TYPE: ICD-10-CM

## 2024-01-23 DIAGNOSIS — F90.2 ATTENTION DEFICIT HYPERACTIVITY DISORDER (ADHD), COMBINED TYPE: ICD-10-CM

## 2024-01-23 DIAGNOSIS — F50.81 BINGE EATING DISORDER: ICD-10-CM

## 2024-01-23 RX ORDER — LISDEXAMFETAMINE DIMESYLATE 70 MG/1
70 CAPSULE ORAL EVERY MORNING
Qty: 30 CAPSULE | Refills: 0 | Status: SHIPPED | OUTPATIENT
Start: 2024-01-23

## 2024-01-23 RX ORDER — LISDEXAMFETAMINE DIMESYLATE 70 MG/1
70 CAPSULE ORAL EVERY MORNING
Qty: 30 CAPSULE | Refills: 0 | OUTPATIENT
Start: 2024-01-23

## 2024-01-23 NOTE — TELEPHONE ENCOUNTER
Rx Refill Note    Requested Prescriptions     Pending Prescriptions Disp Refills    Vyvanse 70 MG capsule [Pharmacy Med Name: VYVANSE 70 MG CAPSULE] 30 capsule 0     Sig: TAKE 1 CAPSULE BY MOUTH EVERY MORNING        Last office visit with prescribing clinician: 6/22/2023      Next office visit with prescribing clinician: 2/12/2024   Last labs:   Last refill: 12/20/2023   Pharmacy (be sure to add in Epic). correct

## 2024-01-23 NOTE — TELEPHONE ENCOUNTER
Patient called and left message requesting a refill.   Spoke to pt.Scheduled appt and labs.Sent reminder to pt.

## 2024-02-26 ENCOUNTER — TELEPHONE (OUTPATIENT)
Dept: BEHAVIORAL HEALTH | Facility: CLINIC | Age: 39
End: 2024-02-26
Payer: MEDICAID

## 2024-02-26 DIAGNOSIS — F90.2 ATTENTION DEFICIT HYPERACTIVITY DISORDER (ADHD), COMBINED TYPE: ICD-10-CM

## 2024-02-26 DIAGNOSIS — F50.81 BINGE EATING DISORDER: ICD-10-CM

## 2024-02-26 RX ORDER — LISDEXAMFETAMINE DIMESYLATE 70 MG/1
70 CAPSULE ORAL EVERY MORNING
Qty: 30 CAPSULE | Refills: 0 | Status: SHIPPED | OUTPATIENT
Start: 2024-02-26

## 2024-02-26 NOTE — TELEPHONE ENCOUNTER
Rx Refill Note    Requested Prescriptions     Pending Prescriptions Disp Refills    Vyvanse 70 MG capsule [Pharmacy Med Name: VYVANSE 70 MG CAPSULE] 30 capsule      Sig: TAKE 1 CAPSULE BY MOUTH EVERY MORNING        Last office visit with prescribing clinician: 6/22/2023      Next office visit with prescribing clinician: 3/13/2024   Last labs:   Last refill: 01/23/2024   Pharmacy (be sure to add in Epic). correct

## 2024-02-26 NOTE — TELEPHONE ENCOUNTER
Incoming Refill Request      Medication requested (name and dose):     VYVANSE 70 MG TABLET     Pharmacy where request should be sent:     [facility] - [place]     Additional details provided by patient:       Best call back number: [phone]    Does the patient have less than a 3 day supply:  [x] Yes  [] No    Laila Deborah Grace Rep  02/26/24, 15:39 EST        {Tip  DIRECTIONS Send the encounter HIGH priority, If patient has less than a 3 day supply. If the patient will run out of medication over the weekend add that information to the additional details line. Send this encounter to the clinical pool:[id number]

## 2024-03-07 DIAGNOSIS — F41.1 GENERALIZED ANXIETY DISORDER: ICD-10-CM

## 2024-03-07 RX ORDER — CLONAZEPAM 1 MG/1
TABLET ORAL
Qty: 60 TABLET | Refills: 0 | Status: SHIPPED | OUTPATIENT
Start: 2024-03-07

## 2024-03-07 NOTE — TELEPHONE ENCOUNTER
Rx Refill Note    Requested Prescriptions     Pending Prescriptions Disp Refills    clonazePAM (KlonoPIN) 1 MG tablet [Pharmacy Med Name: CLONAZEPAM 1 MG TABLET] 60 tablet 0     Sig: TAKE 1 TABLET BY MOUTH TWO TIMES A DAY AS NEEDED FOR ANXIETY        Last office visit with prescribing clinician: 6/22/2023      Next office visit with prescribing clinician: 3/13/2024   Last labs:   Last refill: 09/20/2023   Pharmacy (be sure to add in Epic). correct

## 2024-03-13 ENCOUNTER — TELEPHONE (OUTPATIENT)
Dept: BEHAVIORAL HEALTH | Facility: CLINIC | Age: 39
End: 2024-03-13

## 2024-03-13 NOTE — TELEPHONE ENCOUNTER
PATIENT HAS APPT IN PERSON, HE IS USUALLY A MYCHART VISIT. CALLED PATIENT 2X TO SEE IF IT WAS A MISTAKE WITH SCHEDULING APPT. GOING STRAIGHT TO VOICEMAIL. LVM

## 2024-03-15 ENCOUNTER — TELEPHONE (OUTPATIENT)
Dept: BEHAVIORAL HEALTH | Facility: CLINIC | Age: 39
End: 2024-03-15
Payer: MEDICAID

## 2024-04-10 ENCOUNTER — TELEMEDICINE (OUTPATIENT)
Dept: BEHAVIORAL HEALTH | Facility: CLINIC | Age: 39
End: 2024-04-10
Payer: MEDICAID

## 2024-04-10 VITALS — WEIGHT: 201 LBS | BODY MASS INDEX: 29.68 KG/M2

## 2024-04-10 DIAGNOSIS — E66.3 OVERWEIGHT (BMI 25.0-29.9): ICD-10-CM

## 2024-04-10 DIAGNOSIS — F41.1 GENERALIZED ANXIETY DISORDER: Primary | ICD-10-CM

## 2024-04-10 DIAGNOSIS — F43.10 POST TRAUMATIC STRESS DISORDER (PTSD): ICD-10-CM

## 2024-04-10 DIAGNOSIS — F90.2 ATTENTION DEFICIT HYPERACTIVITY DISORDER (ADHD), COMBINED TYPE: ICD-10-CM

## 2024-04-10 DIAGNOSIS — F33.1 MODERATE EPISODE OF RECURRENT MAJOR DEPRESSIVE DISORDER: ICD-10-CM

## 2024-04-10 DIAGNOSIS — F50.81 BINGE EATING DISORDER: ICD-10-CM

## 2024-04-10 RX ORDER — LISDEXAMFETAMINE DIMESYLATE 70 MG/1
70 CAPSULE ORAL EVERY MORNING
Qty: 30 CAPSULE | Refills: 0 | Status: SHIPPED | OUTPATIENT
Start: 2024-04-10

## 2024-04-10 RX ORDER — CLONAZEPAM 1 MG/1
TABLET ORAL
Qty: 60 TABLET | Refills: 0 | Status: SHIPPED | OUTPATIENT
Start: 2024-04-10

## 2024-04-10 RX ORDER — FLUOXETINE HYDROCHLORIDE 60 MG/1
TABLET, FILM COATED ORAL; ORAL
Qty: 30 TABLET | Refills: 2 | Status: SHIPPED | OUTPATIENT
Start: 2024-04-10

## 2024-04-10 RX ORDER — BUPROPION HYDROCHLORIDE 300 MG/1
TABLET ORAL
Qty: 30 TABLET | Refills: 2 | Status: SHIPPED | OUTPATIENT
Start: 2024-04-10
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
## 2024-05-04 DIAGNOSIS — F41.1 GENERALIZED ANXIETY DISORDER: ICD-10-CM

## 2024-05-06 RX ORDER — CLONAZEPAM 1 MG/1
TABLET ORAL
Qty: 60 TABLET | Refills: 0 | Status: SHIPPED | OUTPATIENT
Start: 2024-05-06

## 2024-05-09 DIAGNOSIS — F90.2 ATTENTION DEFICIT HYPERACTIVITY DISORDER (ADHD), COMBINED TYPE: ICD-10-CM

## 2024-05-09 DIAGNOSIS — F50.81 BINGE EATING DISORDER: ICD-10-CM

## 2024-05-09 RX ORDER — LISDEXAMFETAMINE DIMESYLATE 70 MG/1
70 CAPSULE ORAL EVERY MORNING
Qty: 30 CAPSULE | Refills: 0 | Status: SHIPPED | OUTPATIENT
Start: 2024-05-09

## 2024-06-04 DIAGNOSIS — F41.1 GENERALIZED ANXIETY DISORDER: ICD-10-CM

## 2024-06-04 RX ORDER — CLONAZEPAM 1 MG/1
TABLET ORAL
Qty: 60 TABLET | Refills: 0 | Status: SHIPPED | OUTPATIENT
Start: 2024-06-04

## 2024-06-07 DIAGNOSIS — F90.2 ATTENTION DEFICIT HYPERACTIVITY DISORDER (ADHD), COMBINED TYPE: ICD-10-CM

## 2024-06-07 DIAGNOSIS — F50.81 BINGE EATING DISORDER: ICD-10-CM

## 2024-06-10 RX ORDER — LISDEXAMFETAMINE DIMESYLATE 70 MG/1
70 CAPSULE ORAL EVERY MORNING
Qty: 30 CAPSULE | Refills: 0 | Status: SHIPPED | OUTPATIENT
Start: 2024-06-10

## 2024-07-02 DIAGNOSIS — F41.1 GENERALIZED ANXIETY DISORDER: ICD-10-CM

## 2024-07-02 RX ORDER — CLONAZEPAM 1 MG/1
TABLET ORAL
Qty: 60 TABLET | Refills: 0 | Status: SHIPPED | OUTPATIENT
Start: 2024-07-02

## 2024-07-15 ENCOUNTER — TELEMEDICINE (OUTPATIENT)
Dept: BEHAVIORAL HEALTH | Facility: CLINIC | Age: 39
End: 2024-07-15
Payer: MEDICAID

## 2024-07-15 DIAGNOSIS — F31.9 BIPOLAR 1 DISORDER: Primary | ICD-10-CM

## 2024-07-15 DIAGNOSIS — F50.81 BINGE EATING DISORDER: ICD-10-CM

## 2024-07-15 DIAGNOSIS — F33.1 MODERATE EPISODE OF RECURRENT MAJOR DEPRESSIVE DISORDER: ICD-10-CM

## 2024-07-15 DIAGNOSIS — F19.10 SUBSTANCE ABUSE: ICD-10-CM

## 2024-07-15 DIAGNOSIS — E66.3 OVERWEIGHT (BMI 25.0-29.9): ICD-10-CM

## 2024-07-15 DIAGNOSIS — F90.2 ATTENTION DEFICIT HYPERACTIVITY DISORDER (ADHD), COMBINED TYPE: ICD-10-CM

## 2024-07-15 DIAGNOSIS — F22 PARANOIA: ICD-10-CM

## 2024-07-15 DIAGNOSIS — F41.1 GENERALIZED ANXIETY DISORDER: ICD-10-CM

## 2024-07-15 DIAGNOSIS — F43.10 POST TRAUMATIC STRESS DISORDER (PTSD): ICD-10-CM

## 2024-07-15 PROCEDURE — 1160F RVW MEDS BY RX/DR IN RCRD: CPT | Performed by: NURSE PRACTITIONER

## 2024-07-15 PROCEDURE — 99214 OFFICE O/P EST MOD 30 MIN: CPT | Performed by: NURSE PRACTITIONER

## 2024-07-15 PROCEDURE — 1159F MED LIST DOCD IN RCRD: CPT | Performed by: NURSE PRACTITIONER

## 2024-07-15 RX ORDER — FLUOXETINE HYDROCHLORIDE 60 MG/1
TABLET, FILM COATED ORAL; ORAL
Qty: 30 TABLET | Refills: 2 | Status: SHIPPED | OUTPATIENT
Start: 2024-07-15

## 2024-07-15 RX ORDER — LISDEXAMFETAMINE DIMESYLATE 70 MG/1
70 CAPSULE ORAL EVERY MORNING
Qty: 30 CAPSULE | OUTPATIENT
Start: 2024-07-15

## 2024-07-15 RX ORDER — LUMATEPERONE 42 MG/1
42 CAPSULE ORAL DAILY
Qty: 30 CAPSULE | Refills: 2 | Status: SHIPPED | OUTPATIENT
Start: 2024-07-15

## 2024-07-15 RX ORDER — BUPROPION HYDROCHLORIDE 300 MG/1
TABLET ORAL
Qty: 30 TABLET | Refills: 2 | Status: SHIPPED | OUTPATIENT
Start: 2024-07-15
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
## 2024-07-17 ENCOUNTER — TELEPHONE (OUTPATIENT)
Dept: BEHAVIORAL HEALTH | Facility: CLINIC | Age: 39
End: 2024-07-17
Payer: MEDICAID

## 2024-07-17 NOTE — TELEPHONE ENCOUNTER
Hasmukh wanted me to call and make sure that patient knows to ween off his current dose of clonazepam. 1/2 tab daily for 1 week. Stop the vyvanse. Patient should already be out of it. Continue wellbutrin and fluoxetine. Start caplyta, once a day. Left message for patient to call back.

## 2024-07-24 ENCOUNTER — TELEPHONE (OUTPATIENT)
Dept: BEHAVIORAL HEALTH | Facility: CLINIC | Age: 39
End: 2024-07-24
Payer: MEDICAID

## 2024-07-24 NOTE — TELEPHONE ENCOUNTER
PT STATES THAT HE REMEMBERS [facility] SAYING THEY WERE GOING TO STOP HIS VYVANSE BUT HE DIDN'T REMEMBER TALKING ABOUT STOPPING HIS CLONAZEPAM. HE STATES HE NEEDS THIS MEDICATION AND HE WOULD LIKE SOMEONE TO GIVE HIM A CALL IN REGARDS TO THIS ISSUE. PLEASE ADVISE.

## 2024-07-25 NOTE — TELEPHONE ENCOUNTER
Please let him know I cannot continue any controlled substances for him due to recent substance abuse issues/rehab.  His last fill of clonazepam should have allowed for weaning off.

## 2024-07-29 ENCOUNTER — TELEPHONE (OUTPATIENT)
Dept: BEHAVIORAL HEALTH | Facility: CLINIC | Age: 39
End: 2024-07-29
Payer: MEDICAID

## 2024-07-29 NOTE — TELEPHONE ENCOUNTER
PATIENT CALLED IN REGARD TO HIS CLONAZEPAM. STATES HE DOES NOT REMEMBER DISCUSSING THAT HE WOULD DISCONTINUE IT IN HIS LAST APPT. PT STATES HE NEEDS TO BE ON CLONAZEPAM. STATES IT WILL BE AN EARLY DEATH IF HE IS NOT ON IT.     HE IS FINE WITH IWONA/ARASELI ABAD     PT STATES HIS PCP TOLD HIM THAT HE CAN NOT TAKE CAPLYTA, THAT THERE ARE TOO MANY SIDE AFFECTS WITH HIS OTHER MEDICATION HE IS TAKING.     PLEASE ADVISE

## 2024-07-30 NOTE — TELEPHONE ENCOUNTER
Which medication is his pcp concerned about with the caplyta?  Unfortunately, I am unable to continue any controlled substance for him due to recent events.

## 2024-08-06 NOTE — TELEPHONE ENCOUNTER
I cannot, in good luis daniel, continue to prescribe it for him due to recent medical events and rehabilitation.

## 2024-10-30 ENCOUNTER — TELEMEDICINE (OUTPATIENT)
Dept: BEHAVIORAL HEALTH | Facility: CLINIC | Age: 39
End: 2024-10-30
Payer: MEDICAID

## 2024-10-30 VITALS — WEIGHT: 215 LBS | HEIGHT: 69 IN | BODY MASS INDEX: 31.84 KG/M2

## 2024-10-30 DIAGNOSIS — F43.10 POST TRAUMATIC STRESS DISORDER (PTSD): ICD-10-CM

## 2024-10-30 DIAGNOSIS — F33.40 RECURRENT MAJOR DEPRESSIVE DISORDER, IN REMISSION: ICD-10-CM

## 2024-10-30 DIAGNOSIS — F31.9 BIPOLAR 1 DISORDER: Primary | ICD-10-CM

## 2024-10-30 DIAGNOSIS — E66.3 OVERWEIGHT (BMI 25.0-29.9): ICD-10-CM

## 2024-10-30 DIAGNOSIS — F90.2 ATTENTION DEFICIT HYPERACTIVITY DISORDER (ADHD), COMBINED TYPE: ICD-10-CM

## 2024-10-30 DIAGNOSIS — F41.1 GENERALIZED ANXIETY DISORDER: ICD-10-CM

## 2024-10-30 RX ORDER — LUMATEPERONE 42 MG/1
42 CAPSULE ORAL DAILY
Qty: 30 CAPSULE | Refills: 2 | Status: SHIPPED | OUTPATIENT
Start: 2024-10-30

## 2024-10-30 RX ORDER — LEVETIRACETAM 500 MG/1
500 TABLET ORAL DAILY
COMMUNITY
Start: 2024-10-08

## 2024-10-30 RX ORDER — BICTEGRAVIR SODIUM, EMTRICITABINE, AND TENOFOVIR ALAFENAMIDE FUMARATE 50; 200; 25 MG/1; MG/1; MG/1
1 TABLET ORAL DAILY
COMMUNITY
Start: 2024-05-02

## 2024-10-30 RX ORDER — ARIPIPRAZOLE 10 MG/1
10 TABLET ORAL DAILY
COMMUNITY
Start: 2024-10-21 | End: 2024-10-30 | Stop reason: ALTCHOICE

## 2024-10-30 RX ORDER — FLUOXETINE HYDROCHLORIDE 60 MG/1
TABLET, FILM COATED ORAL; ORAL
Qty: 30 TABLET | Refills: 2 | Status: SHIPPED | OUTPATIENT
Start: 2024-10-30

## 2024-10-30 NOTE — PROGRESS NOTES
Video Visit      Patient Name: Amos Echevarria  : 1985   MRN: [id number]     Referring Provider: Barber West MD    Chief Complaint:      ICD-10-CM ICD-9-CM   1. Bipolar 1 disorder  F31.9 296.7   2. Generalized anxiety disorder  F41.1 300.02   3. Attention deficit hyperactivity disorder (ADHD), combined type  F90.2 314.01   4. Recurrent major depressive disorder, in remission  F33.40 296.35   5. Overweight (BMI 25.0-29.9)  E66.3 278.02   6. Post traumatic stress disorder (PTSD)  F43.10 309.81        This provider is located at the [facility] (through [facility]), [address] [place] [address]. [facility] using a secure [employer] Video Visit through [employer]. Patient is being seen remotely via telehealth video visit at their home address in [place], and stated they are in a secure environment for this session. The patient's condition being diagnosed/treated is appropriate for telemedicine. The provider identified herself as well as her credentials. The patient, and/or patients guardian, consent to be seen remotely, and when consent is given they understand that the consent allows for patient identifiable information to be sent to a third party as needed. They may refuse to be seen remotely at any time. The electronic data is encrypted and password protected, and the patient and/or guardian has been advised of the potential risks to privacy not withstanding such measures.    The patient has chosen to receive care today through a telehealth video visit. Do you consent to use a video/audio connection for your medical care today? Yes    History of Present Illness:   Amos Echevarria is a 39 y.o. male who is being seen by a video visit today for psychiatric medications.    Subjective   I am out of [facility] now.  Living in sober living house in [place].  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  Very happy about everything even though it started out with me being arrested for not  "doing my drug tests through the court system.  They changed my medications around because I had a seizure when I was withdrawing from drugs and I fell and hurt my heel, been wearing a boot.  They stopped my wellbutrin and put me on Keppra but I may not be on it forever.  They also just started abilify but I was on that before and I wanted to try the caplyta that you had prescribed me before.  I was too messed up on drugs then to try it.    Review of Systems:   Review of Systems   Psychiatric/Behavioral:  Positive for stress. The patient is nervous/anxious.        Screening Scores:   PHQ-9 : 0  ZELALEM-7 : 19    RISK ASSESSMENT:  Patient denies any high risk factors today.     Medications:     Current Outpatient Medications:     Biktarvy -25 MG per tablet, Take 1 tablet by mouth Daily., Disp: , Rfl:     Cholecalciferol 25 MCG (1000 UT) tablet, Take 1 tablet by mouth Daily., Disp: , Rfl:     FLUoxetine (PROzac) 60 MG tablet, Take 1 capsule by mouth daily., Disp: 30 tablet, Rfl: 2    levETIRAcetam (KEPPRA) 500 MG tablet, Take 1 tablet by mouth Daily., Disp: , Rfl:     Lumateperone Tosylate (Caplyta) 42 MG capsule, Take 1 capsule by mouth Daily., Disp: 30 capsule, Rfl: 2    Naltrexone (VIVITROL) 380 MG reconstituted suspension IM suspension, Inject 380 mg into the appropriate muscle as directed by prescriber Every 28 (Twenty-Eight) Days., Disp: , Rfl:     omeprazole (priLOSEC) 40 MG capsule, Take 1 capsule by mouth 2 (Two) Times a Day., Disp: , Rfl:     Medication Considerations:  JABIER reviewed and appropriate.      Allergies:   No Known Allergies    Objective     Physical Exam:  Vital Signs:   Vitals:    10/30/24 1602   Weight: 97.5 kg (215 lb)   Height: 175.3 cm (69\")     Body mass index is 31.75 kg/m².     Mental Status Exam:   Hygiene:   good  Cooperation:  Cooperative  Eye Contact:  Good  Psychomotor Behavior:  Hyperactive  Affect:  Appropriate  Mood: normal  Speech:  Normal  Thought Process:  " Tangential  Thought Content:  Normal  Suicidal:  None  Homicidal:  None  Hallucinations:  None  Delusion:  None  Memory:  Intact  Orientation:  Grossly intact  Reliability:  good  Insight:  Fair  Judgement:  Fair  Impulse Control:  Poor  Physical/Medical Issues:   injured heel      Assessment / Plan      Visit Diagnosis/Orders Placed This Visit:  Diagnoses and all orders for this visit:    1. Bipolar 1 disorder (Primary)  -     Lumateperone Tosylate (Caplyta) 42 MG capsule; Take 1 capsule by mouth Daily.  Dispense: 30 capsule; Refill: 2    2. Generalized anxiety disorder  -     FLUoxetine (PROzac) 60 MG tablet; Take 1 capsule by mouth daily.  Dispense: 30 tablet; Refill: 2    3. Attention deficit hyperactivity disorder (ADHD), combined type    4. Recurrent major depressive disorder, in remission  -     FLUoxetine (PROzac) 60 MG tablet; Take 1 capsule by mouth daily.  Dispense: 30 tablet; Refill: 2    5. Overweight (BMI 25.0-29.9)    6. Post traumatic stress disorder (PTSD)  -     FLUoxetine (PROzac) 60 MG tablet; Take 1 capsule by mouth daily.  Dispense: 30 tablet; Refill: 2         Functional Status: Moderate impairment     Prognosis: Guarded with Ongoing Treatment    Impression/Formulation:  Patient appeared alert and oriented. Patient is receptive to assistance with maintaining a stable lifestyle.  Patient presents with history of     ICD-10-CM ICD-9-CM   1. Bipolar 1 disorder  F31.9 296.7   2. Generalized anxiety disorder  F41.1 300.02   3. Attention deficit hyperactivity disorder (ADHD), combined type  F90.2 314.01   4. Recurrent major depressive disorder, in remission  F33.40 296.35   5. Overweight (BMI 25.0-29.9)  E66.3 278.02   6. Post traumatic stress disorder (PTSD)  F43.10 309.81   Negative for depression.  Increased anxiety due to medication changes.  Patient has a very positive outlook since moving to sober living.  Sober since 9/21/24.  Patient reports a seizure when withdrawing from drugs and was  started on Keppra, wellbutrin xl was discontinued.      Treatment Plan:   D/C abilify  Start caplyta  Continue fluoxetine    Patient will continue supportive psychotherapy efforts and medications as indicated. Clinic will obtain release of information for current treatment team for continuity of care as needed. Patient will contact this office, call 911 or present to the nearest emergency room should suicidal or homicidal ideations occur. Discussed medication options and treatment plan of prescribed medication(s) as well as the risks, benefits, and potential side effects. Patient ackowledged and verbally consented to continue with current treatment plan and was educated on the importance of compliance with treatment and follow-up appointments.     Follow Up:   Return in about 3 months (around 1/30/2025) for Med Check.        HERBERT Jarrell, KEMIP-BC  [facility]     This is electronically signed by HERBERT Jarrell, PAULINO-BC  [unfilled] 16:57 EDT

## 2025-07-07 ENCOUNTER — TELEPHONE (OUTPATIENT)
Dept: BEHAVIORAL HEALTH | Facility: CLINIC | Age: 40
End: 2025-07-07

## 2025-07-07 NOTE — TELEPHONE ENCOUNTER
CALLED TO RESCHEDULE APPT WITH ISMAEL THOMAS   PROVIDER IS OUT OF THE OFFICE    UNABLE TO LEAVE MESSAGE, PHONE NUMBER HAS CALL RESTRICTIONS